# Patient Record
Sex: FEMALE | Race: WHITE
[De-identification: names, ages, dates, MRNs, and addresses within clinical notes are randomized per-mention and may not be internally consistent; named-entity substitution may affect disease eponyms.]

---

## 2019-02-20 ENCOUNTER — HOSPITAL ENCOUNTER (EMERGENCY)
Dept: HOSPITAL 47 - EC | Age: 84
Discharge: LEFT BEFORE DISCHARGE | End: 2019-02-20
Payer: MEDICARE

## 2019-02-20 DIAGNOSIS — Z88.0: ICD-10-CM

## 2019-02-20 DIAGNOSIS — Z79.899: ICD-10-CM

## 2019-02-20 DIAGNOSIS — Z87.891: ICD-10-CM

## 2019-02-20 DIAGNOSIS — R55: Primary | ICD-10-CM

## 2019-02-20 DIAGNOSIS — Z88.8: ICD-10-CM

## 2019-02-20 DIAGNOSIS — Z88.6: ICD-10-CM

## 2019-02-20 DIAGNOSIS — Z95.0: ICD-10-CM

## 2019-02-20 DIAGNOSIS — Z79.52: ICD-10-CM

## 2019-02-20 LAB
ALBUMIN SERPL-MCNC: 3.7 G/DL (ref 3.5–5)
ALP SERPL-CCNC: 74 U/L (ref 38–126)
ALT SERPL-CCNC: 32 U/L (ref 9–52)
ANION GAP SERPL CALC-SCNC: 9 MMOL/L
APTT BLD: 21.2 SEC (ref 22–30)
AST SERPL-CCNC: 29 U/L (ref 14–36)
BASOPHILS # BLD AUTO: 0.1 K/UL (ref 0–0.2)
BASOPHILS NFR BLD AUTO: 1 %
BUN SERPL-SCNC: 19 MG/DL (ref 7–17)
CALCIUM SPEC-MCNC: 8.8 MG/DL (ref 8.4–10.2)
CHLORIDE SERPL-SCNC: 93 MMOL/L (ref 98–107)
CK SERPL-CCNC: 50 U/L (ref 30–135)
CO2 SERPL-SCNC: 28 MMOL/L (ref 22–30)
EOSINOPHIL # BLD AUTO: 0.3 K/UL (ref 0–0.7)
EOSINOPHIL NFR BLD AUTO: 3 %
ERYTHROCYTE [DISTWIDTH] IN BLOOD BY AUTOMATED COUNT: 4.87 M/UL (ref 3.8–5.4)
ERYTHROCYTE [DISTWIDTH] IN BLOOD: 14.4 % (ref 11.5–15.5)
GLUCOSE SERPL-MCNC: 147 MG/DL (ref 74–99)
HCT VFR BLD AUTO: 44.1 % (ref 34–46)
HGB BLD-MCNC: 14.3 GM/DL (ref 11.4–16)
INR PPP: 0.9 (ref ?–1.2)
LYMPHOCYTES # SPEC AUTO: 1.4 K/UL (ref 1–4.8)
LYMPHOCYTES NFR SPEC AUTO: 14 %
MAGNESIUM SPEC-SCNC: 2.2 MG/DL (ref 1.6–2.3)
MCH RBC QN AUTO: 29.3 PG (ref 25–35)
MCHC RBC AUTO-ENTMCNC: 32.4 G/DL (ref 31–37)
MCV RBC AUTO: 90.5 FL (ref 80–100)
MONOCYTES # BLD AUTO: 0.5 K/UL (ref 0–1)
MONOCYTES NFR BLD AUTO: 5 %
NEUTROPHILS # BLD AUTO: 7.6 K/UL (ref 1.3–7.7)
NEUTROPHILS NFR BLD AUTO: 76 %
PH UR: 8 [PH] (ref 5–8)
PLATELET # BLD AUTO: 273 K/UL (ref 150–450)
POTASSIUM SERPL-SCNC: 4.4 MMOL/L (ref 3.5–5.1)
PROT SERPL-MCNC: 6.4 G/DL (ref 6.3–8.2)
PT BLD: 9.7 SEC (ref 9–12)
SODIUM SERPL-SCNC: 130 MMOL/L (ref 137–145)
SP GR UR: 1.01 (ref 1–1.03)
TROPONIN I SERPL-MCNC: <0.012 NG/ML (ref 0–0.03)
UROBILINOGEN UR QL STRIP: <2 MG/DL (ref ?–2)
WBC # BLD AUTO: 10 K/UL (ref 3.8–10.6)

## 2019-02-20 PROCEDURE — 36415 COLL VENOUS BLD VENIPUNCTURE: CPT

## 2019-02-20 PROCEDURE — 80053 COMPREHEN METABOLIC PANEL: CPT

## 2019-02-20 PROCEDURE — 85025 COMPLETE CBC W/AUTO DIFF WBC: CPT

## 2019-02-20 PROCEDURE — 85610 PROTHROMBIN TIME: CPT

## 2019-02-20 PROCEDURE — 82553 CREATINE MB FRACTION: CPT

## 2019-02-20 PROCEDURE — 82550 ASSAY OF CK (CPK): CPT

## 2019-02-20 PROCEDURE — 96361 HYDRATE IV INFUSION ADD-ON: CPT

## 2019-02-20 PROCEDURE — 85730 THROMBOPLASTIN TIME PARTIAL: CPT

## 2019-02-20 PROCEDURE — 81003 URINALYSIS AUTO W/O SCOPE: CPT

## 2019-02-20 PROCEDURE — 99285 EMERGENCY DEPT VISIT HI MDM: CPT

## 2019-02-20 PROCEDURE — 96360 HYDRATION IV INFUSION INIT: CPT

## 2019-02-20 PROCEDURE — 84484 ASSAY OF TROPONIN QUANT: CPT

## 2019-02-20 PROCEDURE — 83735 ASSAY OF MAGNESIUM: CPT

## 2019-02-20 PROCEDURE — 93005 ELECTROCARDIOGRAM TRACING: CPT

## 2019-02-20 NOTE — ED
General Adult HPI





- General


Chief complaint: Syncope


Stated complaint: Syncope


Time Seen by Provider: 02/20/19 20:42


Source: patient, family, EMS, RN notes reviewed, old records reviewed


Mode of arrival: ambulatory


Limitations: no limitations





- History of Present Illness


Initial comments: 





chief complaint and history of present illness this is an 83-year-old female 

who while having dinner her head fell forward.  Her family member stated that 

he did not appears though she is breathing deeper to the floor he started doing 

CPR he states that he push on her chest approximately 100 times she took a 

breath and yawned and came to.  She has amnesia for the event.  Family reports 

this is the fifth time this happened in several years the last time was one 

year ago.  The patient does have an on demand pacemaker.  Patient denies any 

headache currently no chest pain palpitations or shortness of breath.  Patient 

states her significant problems in the past as well as been hyponatremia.





- Related Data


 Home Medications











 Medication  Instructions  Recorded  Confirmed


 


Acetaminophen Tab [Tylenol Tab] 500 - 1,000 mg PO Q6H PRN 02/20/19 02/20/19


 


Latanoprost [Xalatan 0.005%] 1 drop LEFT EYE DAILY 02/20/19 02/20/19


 


Timolol 0.5% Ophth Soln [Timoptic 1 drop BOTH EYES DAILY 02/20/19 02/20/19





0.5% Ophth Soln]   


 


predniSONE 5 mg PO QID 02/20/19 02/20/19











 Allergies











Allergy/AdvReac Type Severity Reaction Status Date / Time


 


celecoxib [From Celebrex] AdvReac  Swelling Verified 02/20/19 20:28


 


methotrexate AdvReac  Unknown Verified 02/20/19 20:28


 


Penicillins AdvReac  Rash/Hives Verified 02/20/19 20:28














Review of Systems


ROS Statement: 


Those systems with pertinent positive or pertinent negative responses have been 

documented in the HPI.


review of systems.  Currently the patient is alert and oriented.  Has no 

complaints.  States she was unaware of the events that happened at home.  She 

is denying headache or visual acuity changes no chest pain shortness of breath 

no GI/ problems no neuro deficits.  Past medical problems significant for 

rheumatoid arthritis and history of arrhythmia and bradycardia for which she 

has an on demand pacemaker.  The patient's surgeries include cholecystectomy, 

hysterectomy, atrial pacemaker on demand, tonsillectomy and bilateral knee 

surgeries.  Family history no cancers.  Patient has ALLERGIES to Celebrex, 

methotrexate and penicillin.  She quit smoking 30 years ago denies alcohol use.


ROS Other: All systems not noted in ROS Statement are negative.





Past Medical History


Past Medical History: Rheumatoid Arthritis (RA)


Additional Past Medical History / Comment(s): hx arrhythmia/bradycardia, 

arthritis, Pacemaker, UTI, low sodium


History of Any Multi-Drug Resistant Organisms: None Reported


Past Surgical History: Cholecystectomy, Hysterectomy, Pacemaker, Tonsillectomy


Additional Past Surgical History / Comment(s): knee surgery slime, thyroid surgery

(nodule on parathyroid removed)


Past Anesthesia/Blood Transfusion Reactions: No Reported Reaction


Type of Cardiac Device: Unknown


Device Placement Date:: unknown


Past Psychological History: Anxiety


Smoking Status: Former smoker


Past Alcohol Use History: None Reported


Past Drug Use History: None Reported





- Past Family History


  ** Mother


Family Medical History: Dementia, Diabetes Mellitus





  ** Brother(s)


Family Medical History: Dementia, Diabetes Mellitus





General Exam





- General Exam Comments


Initial Comments: 





General:


The patient is awake and alert, in no distress, and does not appear acutely 

ill. vital signs shows a temperature 97.8 pulse 55 respiratory rate 18 pulse ox 

132/69 and room pulse ox of 98.


Eye:


Pupils are equal, round and reactive to light, extra-ocular movements are intact

; there is normal conjunctiva bilaterally. No signs of icterus. 


Ears, nose, mouth and throat:


There are moist mucous membranes and no oral lesions. 


Neck:


The neck is supple, there is no tenderness , no carotid bruit.


Cardiovascular:


mildly irregular rhythm.. No murmur, rub or gallop is appreciated.


Respiratory:


Lungs are clear to auscultation, respirations are non-labored, breath sounds 

are equal. No wheezes, stridor, rales, or rhonchi.


Gastrointestinal:


Soft, non-distended, non-tender abdomen without masses or organomegaly noted. 

There is no rebound or guarding present. No CVA tenderness. Bowel sounds are 

unremarkable.


Back:


There is no tenderness to palpation in the midline. There is no obvious 

deformity. No rashes noted. 


Musculoskeletal:


Normal ROM, no tenderness, There is no pedal edema. There is no calf tenderness 

or swelling. Sensation intact.  


Neurological:


CN II-XII intact, There are no obvious motor or sensory deficits. Coordination 

appears grossly intact. Speech is normal.no focal or lateralizing signs


Skin:


Skin is warm and dry and no rashes or lesions are noted. 


Psychiatric:


Cooperative, 


Limitations: no limitations





Course


 Vital Signs











  02/20/19 02/20/19 02/20/19





  20:02 21:00 22:00


 


Temperature 97.8 F  


 


Pulse Rate 55 L 75 61


 


Respiratory 18 20 20





Rate   


 


Blood Pressure 132/69 136/75 145/75


 


O2 Sat by Pulse 98 99 99





Oximetry   














EKG Findings





- EKG Comments:


EKG Findings:: EKG was done and reviewed a 2013 showing an atrial sensed 

ventricular paced rhythm.  Rate 72 MS interval 202 QRS 96  QTc 413.  Dr. Lynne





Medical Decision Making





- Medical Decision Making





Medical decision making; 53-year-old female who while having dinner some 

forward.  a family member put her on the floor started doing CPR.  He states 

was between 1 and 2 minutes before she came to.  She had one yawn during that 

time.  Soon thereafter she was back to normal.





In emergency room the patient examined no acute neuro deficits appreciated.  

Patient had no complaints.  Did not complain of chest discomfort even after 

having had 100 compressions of CPR by her family member.





The patient's EKG showed an atrial sensed ventricular paced rhythm.  Unchanged.

  The patient's labs show white count of 10 hemoglobin 14 hematocrit of 44.  

The patient has had a chronic history of low sodium today sodium is 1:30.  

Potassium 4.4 BUN 19 creatinine 0.57 and GFR 87.  Glucose 147.  Troponin less 

than 0.012.  The patient's urine is clean no signs of infection.











I discussed with the patient admission for observation with repeat cardiac 

enzymes and chest x-ray to be taken but the patient has decided to go home.  

She was strongly encouraged to follow-up with her family physician and her 

cardiologist.  This is her fifth time doing this per family.  Family is 

comfortable to take her home.  She is signing out AGAINST MEDICAL ADVICE.





- Lab Data


Result diagrams: 


 02/20/19 20:32





 02/20/19 20:32


 Lab Results











  02/20/19 02/20/19 02/20/19 Range/Units





  20:32 20:32 20:32 


 


WBC   10.0   (3.8-10.6)  k/uL


 


RBC   4.87   (3.80-5.40)  m/uL


 


Hgb   14.3   (11.4-16.0)  gm/dL


 


Hct   44.1   (34.0-46.0)  %


 


MCV   90.5   (80.0-100.0)  fL


 


MCH   29.3   (25.0-35.0)  pg


 


MCHC   32.4   (31.0-37.0)  g/dL


 


RDW   14.4   (11.5-15.5)  %


 


Plt Count   273   (150-450)  k/uL


 


Neutrophils %   76   %


 


Lymphocytes %   14   %


 


Monocytes %   5   %


 


Eosinophils %   3   %


 


Basophils %   1   %


 


Neutrophils #   7.6   (1.3-7.7)  k/uL


 


Lymphocytes #   1.4   (1.0-4.8)  k/uL


 


Monocytes #   0.5   (0-1.0)  k/uL


 


Eosinophils #   0.3   (0-0.7)  k/uL


 


Basophils #   0.1   (0-0.2)  k/uL


 


PT     (9.0-12.0)  sec


 


INR     (<1.2)  


 


APTT     (22.0-30.0)  sec


 


Sodium    130 L  (137-145)  mmol/L


 


Potassium    4.4  (3.5-5.1)  mmol/L


 


Chloride    93 L  ()  mmol/L


 


Carbon Dioxide    28  (22-30)  mmol/L


 


Anion Gap    9  mmol/L


 


BUN    19 H  (7-17)  mg/dL


 


Creatinine    0.57  (0.52-1.04)  mg/dL


 


Est GFR (CKD-EPI)AfAm    >90  (>60 ml/min/1.73 sqM)  


 


Est GFR (CKD-EPI)NonAf    87  (>60 ml/min/1.73 sqM)  


 


Glucose    147 H  (74-99)  mg/dL


 


Calcium    8.8  (8.4-10.2)  mg/dL


 


Magnesium    2.2  (1.6-2.3)  mg/dL


 


Total Bilirubin    0.6  (0.2-1.3)  mg/dL


 


AST    29  (14-36)  U/L


 


ALT    32  (9-52)  U/L


 


Alkaline Phosphatase    74  ()  U/L


 


Total Creatine Kinase  50    ()  U/L


 


CK-MB (CK-2)  1.1    (0.0-2.4)  ng/mL


 


CK-MB (CK-2) Rel Index  2.2    


 


Troponin I  <0.012    (0.000-0.034)  ng/mL


 


Total Protein    6.4  (6.3-8.2)  g/dL


 


Albumin    3.7  (3.5-5.0)  g/dL


 


Urine Color     


 


Urine Appearance     (Clear)  


 


Urine pH     (5.0-8.0)  


 


Ur Specific Gravity     (1.001-1.035)  


 


Urine Protein     (Negative)  


 


Urine Glucose (UA)     (Negative)  


 


Urine Ketones     (Negative)  


 


Urine Blood     (Negative)  


 


Urine Nitrite     (Negative)  


 


Urine Bilirubin     (Negative)  


 


Urine Urobilinogen     (<2.0)  mg/dL


 


Ur Leukocyte Esterase     (Negative)  














  02/20/19 02/20/19 Range/Units





  20:32 Unknown 


 


WBC    (3.8-10.6)  k/uL


 


RBC    (3.80-5.40)  m/uL


 


Hgb    (11.4-16.0)  gm/dL


 


Hct    (34.0-46.0)  %


 


MCV    (80.0-100.0)  fL


 


MCH    (25.0-35.0)  pg


 


MCHC    (31.0-37.0)  g/dL


 


RDW    (11.5-15.5)  %


 


Plt Count    (150-450)  k/uL


 


Neutrophils %    %


 


Lymphocytes %    %


 


Monocytes %    %


 


Eosinophils %    %


 


Basophils %    %


 


Neutrophils #    (1.3-7.7)  k/uL


 


Lymphocytes #    (1.0-4.8)  k/uL


 


Monocytes #    (0-1.0)  k/uL


 


Eosinophils #    (0-0.7)  k/uL


 


Basophils #    (0-0.2)  k/uL


 


PT  9.7   (9.0-12.0)  sec


 


INR  0.9   (<1.2)  


 


APTT  21.2 L   (22.0-30.0)  sec


 


Sodium    (137-145)  mmol/L


 


Potassium    (3.5-5.1)  mmol/L


 


Chloride    ()  mmol/L


 


Carbon Dioxide    (22-30)  mmol/L


 


Anion Gap    mmol/L


 


BUN    (7-17)  mg/dL


 


Creatinine    (0.52-1.04)  mg/dL


 


Est GFR (CKD-EPI)AfAm    (>60 ml/min/1.73 sqM)  


 


Est GFR (CKD-EPI)NonAf    (>60 ml/min/1.73 sqM)  


 


Glucose    (74-99)  mg/dL


 


Calcium    (8.4-10.2)  mg/dL


 


Magnesium    (1.6-2.3)  mg/dL


 


Total Bilirubin    (0.2-1.3)  mg/dL


 


AST    (14-36)  U/L


 


ALT    (9-52)  U/L


 


Alkaline Phosphatase    ()  U/L


 


Total Creatine Kinase    ()  U/L


 


CK-MB (CK-2)    (0.0-2.4)  ng/mL


 


CK-MB (CK-2) Rel Index    


 


Troponin I    (0.000-0.034)  ng/mL


 


Total Protein    (6.3-8.2)  g/dL


 


Albumin    (3.5-5.0)  g/dL


 


Urine Color   Light Yellow  


 


Urine Appearance   Clear  (Clear)  


 


Urine pH   8.0  (5.0-8.0)  


 


Ur Specific Gravity   1.008  (1.001-1.035)  


 


Urine Protein   Negative  (Negative)  


 


Urine Glucose (UA)   Negative  (Negative)  


 


Urine Ketones   Negative  (Negative)  


 


Urine Blood   Negative  (Negative)  


 


Urine Nitrite   Negative  (Negative)  


 


Urine Bilirubin   Negative  (Negative)  


 


Urine Urobilinogen   <2.0  (<2.0)  mg/dL


 


Ur Leukocyte Esterase   Negative  (Negative)  














Disposition


Clinical Impression: 


 Syncopal episodes





Disposition: Left Against Medical Advice


Condition: Undetermined


Instructions (If sedation given, give patient instructions):  Syncope (ED), 

Syncope in Older Adults (ED)


Additional Instructions: 


Change positions slowly, increase fluid intake.  Call follow with her family 

doctor and cardiologist.  Return emergency room at any time, any emergency 

room.  Change positions slowly as this is when syncopal episodes can happen 

resulting in a fall causing serious injury.


Is patient prescribed a controlled substance at d/c from ED?: No


Referrals: 


Vikas Segovia DO [Primary Care Provider] - 1-2 days


Time of Disposition: 23:35

## 2019-02-21 VITALS
RESPIRATION RATE: 18 BRPM | HEART RATE: 71 BPM | DIASTOLIC BLOOD PRESSURE: 52 MMHG | TEMPERATURE: 98.1 F | SYSTOLIC BLOOD PRESSURE: 121 MMHG

## 2019-08-19 ENCOUNTER — HOSPITAL ENCOUNTER (EMERGENCY)
Dept: HOSPITAL 47 - EC | Age: 84
Discharge: HOME | End: 2019-08-19
Payer: MEDICARE

## 2019-08-19 VITALS
RESPIRATION RATE: 16 BRPM | DIASTOLIC BLOOD PRESSURE: 70 MMHG | HEART RATE: 87 BPM | SYSTOLIC BLOOD PRESSURE: 110 MMHG | TEMPERATURE: 98 F

## 2019-08-19 DIAGNOSIS — M25.561: ICD-10-CM

## 2019-08-19 DIAGNOSIS — Z79.52: ICD-10-CM

## 2019-08-19 DIAGNOSIS — Z88.8: ICD-10-CM
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
## 2019-08-19 DIAGNOSIS — S70.01XA: ICD-10-CM

## 2019-08-19 DIAGNOSIS — Z96.651: ICD-10-CM

## 2019-08-19 DIAGNOSIS — Z88.0: ICD-10-CM

## 2019-08-19 DIAGNOSIS — M06.9: ICD-10-CM

## 2019-08-19 DIAGNOSIS — S00.03XA: Primary | ICD-10-CM

## 2019-08-19 DIAGNOSIS — Z95.0: ICD-10-CM
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
## 2019-08-19 DIAGNOSIS — Z88.6: ICD-10-CM

## 2019-08-19 DIAGNOSIS — M79.89: ICD-10-CM
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
## 2019-08-19 DIAGNOSIS — Z87.891: ICD-10-CM

## 2019-08-19 PROCEDURE — 72125 CT NECK SPINE W/O DYE: CPT

## 2019-08-19 PROCEDURE — 71046 X-RAY EXAM CHEST 2 VIEWS: CPT

## 2019-08-19 PROCEDURE — 70450 CT HEAD/BRAIN W/O DYE: CPT

## 2019-08-19 PROCEDURE — 99284 EMERGENCY DEPT VISIT MOD MDM: CPT

## 2019-08-19 PROCEDURE — 73502 X-RAY EXAM HIP UNI 2-3 VIEWS: CPT

## 2019-08-19 NOTE — XR
EXAMINATION TYPE: XR chest 2V

 

DATE OF EXAM: 8/19/2019

 

COMPARISON: 2/22/2018

 

HISTORY: 2/22/2018

 

TECHNIQUE:  Frontal and lateral views of the chest are obtained.

 

FINDINGS:  Heart is normal. Lungs are clear of consolidation. There is large calcified right paratrac
heal granuloma. There is a left axillary pacemaker. There is significant arthritic disease in both sh
oulder joints that could relate to inflammatory arthritis. There is no pleural effusion. Bony thorax 
is intact.

 

IMPRESSION:  No active cardiopulmonary disease. Old granulomatous disease. No change.

## 2019-08-19 NOTE — ED
Fall HPI





- General


Chief Complaint: Fall


Stated Complaint: fall


Time Seen by Provider: 08/19/19 15:44


Source: patient


Mode of arrival: wheelchair





- History of Present Illness


Initial Comments: 





Patient is an 84-year-old female presenting to emergency Department with a chief

complaint of fall.  Patient reports she was walking out of her house one hour 

ago when she tripped over the last step and fell on the right side of her body. 

Patient reports most of the pain is located in the right knee which she had an 

arthroplasty on.  Patient also reports tenderness in the right hip.  Patient is 

also complaining of a "bump" in the occipital region.  Patient denies loss of 

conscious time of incident.  Patient reports she called her neighbors who came 

in home her up.  Patient denies any lightheadedness, dizziness, blurred vision, 

nausea or vomiting.  Patient hasn't chest pain chest tightness or shortness of 

breath.  The patient's guardian called and said the patient is abusing illegal 

substances and advised not to prescribe any benzos and narcotics.





- Related Data


                                Home Medications











 Medication  Instructions  Recorded  Confirmed


 


Acetaminophen Tab [Tylenol Tab] 500 - 1,000 mg PO Q6H PRN 02/20/19 08/19/19


 


Latanoprost [Xalatan 0.005%] 1 drop LEFT EYE DAILY 02/20/19 08/19/19


 


Timolol 0.5% Ophth Soln [Timoptic 1 drop BOTH EYES DAILY 02/20/19 08/19/19





0.5% Ophth Soln]   


 


predniSONE 7.5 mg PO BID 02/20/19 08/19/19











                                    Allergies











Allergy/AdvReac Type Severity Reaction Status Date / Time


 


celecoxib [From Celebrex] AdvReac  Swelling Verified 08/19/19 16:53


 


methotrexate AdvReac  Unknown Verified 08/19/19 16:53


 


Penicillins AdvReac  Rash/Hives Verified 08/19/19 16:53














Review of Systems


ROS Statement: 


Those systems with pertinent positive or pertinent negative responses have been 

documented in the HPI.





ROS Other: All systems not noted in ROS Statement are negative.





Past Medical History


Past Medical History: Rheumatoid Arthritis (RA)


Additional Past Medical History / Comment(s): hx arrhythmia/bradycardia, 

arthritis, Pacemaker, UTI, low sodium


History of Any Multi-Drug Resistant Organisms: None Reported


Past Surgical History: Cholecystectomy, Hysterectomy, Pacemaker, Tonsillectomy


Additional Past Surgical History / Comment(s): knee surgery slime, thyroid 

surgery(nodule on parathyroid removed)


Past Anesthesia/Blood Transfusion Reactions: No Reported Reaction


Type of Cardiac Device: Unknown


Device Placement Date:: unknown


Past Psychological History: Anxiety


Smoking Status: Former smoker


Past Alcohol Use History: None Reported


Past Drug Use History: None Reported





- Past Family History


  ** Mother


Family Medical History: Dementia, Diabetes Mellitus





  ** Brother(s)


Family Medical History: Dementia, Diabetes Mellitus





General Exam


Limitations: no limitations


General appearance: alert, in no apparent distress


Head exam: Present: normocephalic, normal inspection.  Absent: atraumatic 

(Hematoma measuring 37 m in diameter in the right occipital region)


Eye exam: Present: normal appearance, PERRL, EOMI.  Absent: periorbital 

swelling, periorbital tenderness


Pupils: Present: normal accommodation


ENT exam: Present: normal exam, normal oropharynx, mucous membranes moist, TM's 

normal bilaterally, normal external ear exam, other (No fractured teeth or any 

oral hemorrhage.)


Neck exam: Present: normal inspection, full ROM


Respiratory exam: Present: normal lung sounds bilaterally


Cardiovascular Exam: Present: regular rate, normal rhythm, normal heart sounds


GI/Abdominal exam: Present: soft


Extremities exam: Present: tenderness (Right hip tenderness and right knee 

tenderness with palpation.), normal capillary refill, other (+2 ulnar and radial

pulses bilaterally.  +2 dorsalis pedis and posterior tibialis bilaterally).  

Absent: normal inspection (Hematoma measuring 3 cm in diameter on the lateral 

aspect her right upper leg), full ROM (Limited range of motion in the right knee

with flexion)


Back exam: Present: normal inspection, full ROM.  Absent: tenderness, CVA 

tenderness (R), CVA tenderness (L)


Neurological exam: Present: alert, oriented X3


Psychiatric exam: Present: normal affect, normal mood


Skin exam: Present: warm, intact, normal color





Course


                                   Vital Signs











  08/19/19 08/19/19





  15:38 19:22


 


Temperature 97.6 F 98.0 F


 


Pulse Rate 74 87


 


Respiratory 18 16





Rate  


 


Blood Pressure 104/64 110/70


 


O2 Sat by Pulse 99 99





Oximetry  














Medical Decision Making





- Medical Decision Making





Patient is a 84-year-old female presents emergency Department with a chief 

complaint of a fall.  CT of the brain and C-spine is negative for acute fr

actures, dislocations, hemorrhage, midline shift.  X-rays of the right hip, 

right knee and chest are unremarkable.  I did notice a hematoma on the right 

occipital region where patient had a traumatic head injury.  There is also an 

abrasion in the region as well.  Patient does have a hematoma on the right hip. 

There is swelling at the right knee.  Patient advised to follow with orthopedic 

specialist if symptoms do not improve.  Patient vised alternate between Tylenol 

and ibuprofen for pain control.  Her son reports that he and his sister will be 

alternating for next 2 weeks to take care for the patient.  Patient typically 

ambulates using a walker at home.  Patient was given analgesia.  Patient was 

able to ambulate using a walker per to discharge.  Patient is to follow-up with 

primary care.  Strict return parameters were thoroughly discussed with the 

patient was understanding and agreeable.  Case discussed with physician.





Disposition


Clinical Impression: 


 Fall





Disposition: HOME SELF-CARE


Condition: Stable


Instructions (If sedation given, give patient instructions):  Fall Prevention 

for Older Adults (ED)


Additional Instructions: 


Please follow with primary care.  Alternate between Tylenol and ibuprofen for 

pain control.  Please return to emergency department if symptoms worsen.


Is patient prescribed a controlled substance at d/c from ED?: No


Referrals: 


Vikas Segovia DO [Primary Care Provider] - 1-2 days


Aydin Burgess MD [Medical Doctor] - 1-2 days


Time of Disposition: 17:54

## 2019-08-19 NOTE — CT
EXAMINATION TYPE: CT brain cspine wo con

 

DATE OF EXAM: 8/19/2019

 

COMPARISON: None

 

HISTORY: pain post fall, posterior head laceration/contusion

Neck pain

CT DLP: 1196.6 mGycm

Automated exposure control for dose reduction was used.

 

TECHNIQUE: CT scan of the head and cervical spine are performed without contrast.

 

FINDINGS:   There is cerebral cortical atrophy. There is no mass effect nor midline shift. There is n
o sign of intracranial hemorrhage. There is frontal hyperostosis. Calvarium is intact.

 

There is some straightening of the cervical spine. There is degenerative disc space down from C3 to C
7 with spur formation. There is more severe disc disease at C3-4. There is mild hypertrophic facet ar
thropathy. Skull base is intact.

 

IMPRESSION:

Cerebral atrophy. No acute intracranial abnormality.

 

Spondylotic changes in the cervical spine. No fracture.

## 2019-08-19 NOTE — XR
EXAMINATION TYPE: XR Hip Complete RT

 

DATE OF EXAM: 8/19/2019

 

COMPARISON: NONE

 

HISTORY: Hip pain

 

TECHNIQUE: 2 views

 

FINDINGS: There is acetabular spurring. I see no fracture nor dislocation. Hip joint space is fairly 
normal. Sacroiliac joint is intact.

 

IMPRESSION: Osteopenia. No acute abnormality of the right hip.

## 2019-08-19 NOTE — XR
EXAMINATION TYPE: XR knee limited RT

 

DATE OF EXAM: 8/19/2019

 

COMPARISON: NONE

 

HISTORY: Knee pain

 

TECHNIQUE: 2 views

 

FINDINGS: There is right knee prosthesis. Components are in anatomic position. There is mild anterior
 soft tissue swelling.

 

IMPRESSION: Soft tissue swelling. No fracture seen. Osteopenia.

## 2020-04-02 ENCOUNTER — HOSPITAL ENCOUNTER (OUTPATIENT)
Dept: HOSPITAL 47 - CATHEP | Age: 85
Discharge: HOME | End: 2020-04-02
Attending: INTERNAL MEDICINE
Payer: MEDICARE

## 2020-04-02 VITALS — SYSTOLIC BLOOD PRESSURE: 111 MMHG | DIASTOLIC BLOOD PRESSURE: 66 MMHG | HEART RATE: 62 BPM

## 2020-04-02 VITALS — TEMPERATURE: 97.9 F | RESPIRATION RATE: 16 BRPM

## 2020-04-02 VITALS — BODY MASS INDEX: 20 KG/M2

## 2020-04-02 DIAGNOSIS — M06.9: ICD-10-CM

## 2020-04-02 DIAGNOSIS — I95.89: ICD-10-CM

## 2020-04-02 DIAGNOSIS — I10: ICD-10-CM

## 2020-04-02 DIAGNOSIS — Z79.899: ICD-10-CM

## 2020-04-02 DIAGNOSIS — Z45.010: Primary | ICD-10-CM

## 2020-04-02 DIAGNOSIS — F17.210: ICD-10-CM

## 2020-04-02 DIAGNOSIS — Z88.0: ICD-10-CM

## 2020-04-02 DIAGNOSIS — D50.9: ICD-10-CM

## 2020-04-02 DIAGNOSIS — Z88.5: ICD-10-CM

## 2020-04-02 DIAGNOSIS — Z88.6: ICD-10-CM

## 2020-04-02 DIAGNOSIS — Z79.52: ICD-10-CM

## 2020-04-02 LAB
ANION GAP SERPL CALC-SCNC: 7 MMOL/L
BASOPHILS # BLD AUTO: 0 K/UL (ref 0–0.2)
BASOPHILS NFR BLD AUTO: 0 %
BUN SERPL-SCNC: 18 MG/DL (ref 7–17)
CALCIUM SPEC-MCNC: 8.6 MG/DL (ref 8.4–10.2)
CHLORIDE SERPL-SCNC: 101 MMOL/L (ref 98–107)
CO2 SERPL-SCNC: 28 MMOL/L (ref 22–30)
EOSINOPHIL # BLD AUTO: 0.1 K/UL (ref 0–0.7)
EOSINOPHIL NFR BLD AUTO: 1 %
ERYTHROCYTE [DISTWIDTH] IN BLOOD BY AUTOMATED COUNT: 4.8 M/UL (ref 3.8–5.4)
ERYTHROCYTE [DISTWIDTH] IN BLOOD: 16.3 % (ref 11.5–15.5)
GLUCOSE BLD-MCNC: 95 MG/DL (ref 75–99)
GLUCOSE SERPL-MCNC: 93 MG/DL (ref 74–99)
HCT VFR BLD AUTO: 37.2 % (ref 34–46)
HGB BLD-MCNC: 11.2 GM/DL (ref 11.4–16)
LYMPHOCYTES # SPEC AUTO: 0.6 K/UL (ref 1–4.8)
LYMPHOCYTES NFR SPEC AUTO: 5 %
MCH RBC QN AUTO: 23.3 PG (ref 25–35)
MCHC RBC AUTO-ENTMCNC: 30.1 G/DL (ref 31–37)
MCV RBC AUTO: 77.3 FL (ref 80–100)
MONOCYTES # BLD AUTO: 0.5 K/UL (ref 0–1)
MONOCYTES NFR BLD AUTO: 4 %
NEUTROPHILS # BLD AUTO: 12.2 K/UL (ref 1.3–7.7)
NEUTROPHILS NFR BLD AUTO: 90 %
PLATELET # BLD AUTO: 405 K/UL (ref 150–450)
POTASSIUM SERPL-SCNC: 3.8 MMOL/L (ref 3.5–5.1)
SODIUM SERPL-SCNC: 136 MMOL/L (ref 137–145)
WBC # BLD AUTO: 13.5 K/UL (ref 3.8–10.6)

## 2020-04-02 PROCEDURE — 80048 BASIC METABOLIC PNL TOTAL CA: CPT

## 2020-04-02 PROCEDURE — 33228 REMV&REPLC PM GEN DUAL LEAD: CPT

## 2020-04-02 PROCEDURE — 85025 COMPLETE CBC W/AUTO DIFF WBC: CPT

## 2020-04-02 NOTE — CE
CARDIAC ELECTROPHYSIOLOGY REPORT



DATE OF SERVICE:

04/02/2020.



PROCEDURE:

1. Explantation of a pulse generator from the left infraclavicular location.

2. Implantation of a new pulse generator for a dual-chamber pacemaker in the left

    infraclavicular location.



PERFORMED BY:

Dr. SELVIN Eaton.



Moderate conscious sedation time was 43 minutes.  Patient was administered fentanyl.

Oxygen saturation, hemodynamics and EKG were monitored closely.



CLINICAL INFORMATION:

Mrs. Kamini William is an elderly lady with a dual-chamber permanent pacemaker that

has reached end of life.  The last generator change was performed by me in 2004.  She

was brought in for the procedure electively as the device had reached end of life.  The

patient was appropriately sedated.



PROCEDURE NOTE:

Under strict aseptic precautions and local anesthesia, a linear incision was made over

the pacemaker.  Blunt dissection was performed with cautery.  The old pulse generator

was explanted very carefully.  The pacemaker leads were unscrewed.  The leads were

checked for thresholds and sensitivities.  The new pulse generator was then connected

and the new pulse generator was kept back in the pocket and the wound was closed in 2

layers very carefully.  The patient received antibiotic infusion and the pocket was

also irrigated with antibiotic locally.  Antibiotic was infused at the start of the

procedure during the incision.



The patient tolerated the procedure well without complications.



DETAILS:

Explanted device was made by [employer] and the model #1294, serial #[id number].



The new device that was implanted was manufactured by [employer]

2272, serial #[id number].  The atrial lead was a [employer] lead, which was

unchanged.  This lead was model 4269, serial #[id number].  The ventricular lead was also

made by [employer] model 4260, serial #[id number].  These leads were originally

implanted in December 1990



The atrial threshold was 1.25 V at 0.5 milliseconds.  The P waves were 1.4 mV.  The

lead impedance was 380 ohms.  The ventricular threshold was 1.25 V at 0.5 milliseconds.

The R-waves were 5.5 mV.  The lead impedance was 460 ohms.

The pacemaker was set in a DDDR mode, a low rate of 50, high rate of 100, paced AV

delay of 275 milliseconds and sensed AV delay of 250 milliseconds.



The patient tolerated the procedure well.  She will be discharged later on today and

will see me in the office in one week for a device check and wound check.  I discussed

the details with the patient as well as her daughter.



Procedure was performed uneventfully without any complications or issues.





KITTY / CASSIDY: [id number] / Job#: [id number]

## 2020-09-16 ENCOUNTER — HOSPITAL ENCOUNTER (OUTPATIENT)
Dept: HOSPITAL 47 - RADCTMAIN | Age: 85
Discharge: HOME | End: 2020-09-16
Attending: FAMILY MEDICINE
Payer: MEDICARE

## 2020-09-16 DIAGNOSIS — I67.82: Primary | ICD-10-CM

## 2020-09-16 DIAGNOSIS — G31.1: ICD-10-CM

## 2020-09-16 PROCEDURE — 70450 CT HEAD/BRAIN W/O DYE: CPT

## 2020-09-17 NOTE — CT
EXAMINATION TYPE: CT brain wo con

 

DATE OF EXAM: 9/16/2020

 

HISTORY: Intractable headaches

 

CT DLP: 1133.30 mGycm.  Automated Exposure Control for Dose Reduction was Utilized.

 

TECHNIQUE: CT scan of the head is performed without contrast.

 

COMPARISON: CT brain April 19, 2019..

 

FINDINGS:   There is no acute intracranial hemorrhage or midline shift identified. There is diffuse v
entricular and sulcal prominence consistent with diffuse age-related cerebral atrophy.  There is low-
attenuation in the periventricular white matter consistent with chronic small vessel ischemic change.
  Hyperostosis frontalis is redemonstrated. Vascular calcifications distal internal carotid arteries 
is again seen. The globes are intact and the visualized sinuses are clear.   

 

IMPRESSION:  No acute intracranial hemorrhage or midline shift.  There is moderate diffuse age-relate
d cerebral atrophy and chronic small vessel ischemic change redemonstrated.  No significant change fr
om prior study.